# Patient Record
Sex: FEMALE | Race: BLACK OR AFRICAN AMERICAN
[De-identification: names, ages, dates, MRNs, and addresses within clinical notes are randomized per-mention and may not be internally consistent; named-entity substitution may affect disease eponyms.]

---
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
## 2023-06-16 ENCOUNTER — HOSPITAL ENCOUNTER (OUTPATIENT)
Dept: HOSPITAL 47 - FBPOP | Age: 18
Discharge: HOME | End: 2023-06-16
Attending: OBSTETRICS & GYNECOLOGY
Payer: COMMERCIAL

## 2023-06-16 ENCOUNTER — HOSPITAL ENCOUNTER (EMERGENCY)
Dept: HOSPITAL 47 - EC | Age: 18
Discharge: HOME | End: 2023-06-16
Payer: COMMERCIAL

## 2023-06-16 VITALS
SYSTOLIC BLOOD PRESSURE: 100 MMHG | RESPIRATION RATE: 16 BRPM | DIASTOLIC BLOOD PRESSURE: 63 MMHG | HEART RATE: 83 BPM | TEMPERATURE: 97 F

## 2023-06-16 VITALS — HEART RATE: 85 BPM | SYSTOLIC BLOOD PRESSURE: 91 MMHG | DIASTOLIC BLOOD PRESSURE: 52 MMHG

## 2023-06-16 VITALS — RESPIRATION RATE: 18 BRPM | TEMPERATURE: 98.3 F

## 2023-06-16 VITALS
DIASTOLIC BLOOD PRESSURE: 53 MMHG | TEMPERATURE: 97.8 F | SYSTOLIC BLOOD PRESSURE: 104 MMHG | RESPIRATION RATE: 16 BRPM | HEART RATE: 74 BPM

## 2023-06-16 DIAGNOSIS — Z86.59: ICD-10-CM

## 2023-06-16 DIAGNOSIS — Z3A.20: ICD-10-CM

## 2023-06-16 DIAGNOSIS — O26.892: Primary | ICD-10-CM
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
## 2023-06-16 DIAGNOSIS — Z87.891: ICD-10-CM

## 2023-06-16 DIAGNOSIS — O26.812: Primary | ICD-10-CM

## 2023-06-16 LAB
ALBUMIN SERPL-MCNC: 3.7 G/DL (ref 3.5–5)
ALP SERPL-CCNC: 72 U/L (ref 38–126)
ALT SERPL-CCNC: 18 U/L (ref 4–34)
ANION GAP SERPL CALC-SCNC: 7 MMOL/L
AST SERPL-CCNC: 19 U/L (ref 14–36)
BASOPHILS # BLD AUTO: 0 K/UL (ref 0–0.2)
BASOPHILS NFR BLD AUTO: 0 %
BUN SERPL-SCNC: 11 MG/DL (ref 7–17)
CALCIUM SPEC-MCNC: 8.6 MG/DL (ref 8.4–10.2)
CHLORIDE SERPL-SCNC: 103 MMOL/L (ref 98–107)
CO2 SERPL-SCNC: 22 MMOL/L (ref 22–30)
EOSINOPHIL # BLD AUTO: 0.1 K/UL (ref 0–0.7)
EOSINOPHIL NFR BLD AUTO: 1 %
ERYTHROCYTE [DISTWIDTH] IN BLOOD BY AUTOMATED COUNT: 4.08 M/UL (ref 3.8–5.4)
ERYTHROCYTE [DISTWIDTH] IN BLOOD: 13.6 % (ref 11.5–15.5)
GLUCOSE BLD-MCNC: 137 MG/DL (ref 70–110)
GLUCOSE BLD-MCNC: 90 MG/DL (ref 70–110)
GLUCOSE SERPL-MCNC: 85 MG/DL (ref 74–99)
HCT VFR BLD AUTO: 31.6 % (ref 34–46)
HGB BLD-MCNC: 10.7 GM/DL (ref 11.4–16)
LYMPHOCYTES # SPEC AUTO: 1.2 K/UL (ref 1–4.8)
LYMPHOCYTES NFR SPEC AUTO: 18 %
MCH RBC QN AUTO: 26.3 PG (ref 25–35)
MCHC RBC AUTO-ENTMCNC: 33.9 G/DL (ref 31–37)
MCV RBC AUTO: 77.6 FL (ref 80–100)
MONOCYTES # BLD AUTO: 0.5 K/UL (ref 0–1)
MONOCYTES NFR BLD AUTO: 7 %
NEUTROPHILS # BLD AUTO: 4.9 K/UL (ref 1.3–7.7)
NEUTROPHILS NFR BLD AUTO: 73 %
PLATELET # BLD AUTO: 220 K/UL (ref 150–450)
POTASSIUM SERPL-SCNC: 4.3 MMOL/L (ref 3.5–5.1)
PROT SERPL-MCNC: 6.9 G/DL (ref 6.3–8.2)
SODIUM SERPL-SCNC: 132 MMOL/L (ref 137–145)
WBC # BLD AUTO: 6.8 K/UL (ref 4–11)

## 2023-06-16 PROCEDURE — 99213 OFFICE O/P EST LOW 20 MIN: CPT

## 2023-06-16 PROCEDURE — 96361 HYDRATE IV INFUSION ADD-ON: CPT

## 2023-06-16 PROCEDURE — 93005 ELECTROCARDIOGRAM TRACING: CPT

## 2023-06-16 PROCEDURE — 99284 EMERGENCY DEPT VISIT MOD MDM: CPT

## 2023-06-16 PROCEDURE — 85025 COMPLETE CBC W/AUTO DIFF WBC: CPT

## 2023-06-16 PROCEDURE — 96360 HYDRATION IV INFUSION INIT: CPT

## 2023-06-16 PROCEDURE — 36415 COLL VENOUS BLD VENIPUNCTURE: CPT

## 2023-06-16 PROCEDURE — 80053 COMPREHEN METABOLIC PANEL: CPT

## 2023-06-16 NOTE — ED
General Adult HPI





- General


Chief complaint: Fall


Stated complaint: fall, 20 wks preg


Time Seen by Provider: 23 19:25


Source: patient, RN notes reviewed


Mode of arrival: wheelchair


Limitations: no limitations





- History of Present Illness


Initial comments: 





18-year-old -American female who is approximately 20 weeks pregnant 

presents the emergency department with a chief complaint of syncope.  Patient 

reports that she was at the grocery store when she was pushing a cart and fell 

forward.  She reports hitting her head on the cart.  She reports that she woke 

up with someone holding her feet up.  She is unsure for how long she lost 

consciousness.  She has never had a history of this before.  She is reporting 

feeling dizzy and having a headache at this time.  Denies history of seizures or

diabetes.  Denies chest pain, shortness of breath, vision changes, vision loss, 

nausea, vomiting.  She reports that she did eat something earlier today.





- Related Data


                                Home Medications











 Medication  Instructions  Recorded  Confirmed


 


Prenatal Vit No.179/Iron/Folic 1 tab PO DAILY 22





[Prenatal Tablet]   











                                    Allergies











Allergy/AdvReac Type Severity Reaction Status Date / Time


 


No Known Allergies Allergy   Verified 23 19:17














Review of Systems


ROS Statement: 


Those systems with pertinent positive or pertinent negative responses have been 

documented in the HPI.





ROS Other: All systems not noted in ROS Statement are negative.





Past Medical History


Past Medical History: No Reported History


History of Any Multi-Drug Resistant Organisms: None Reported


Past Surgical History:  Section


Additional Past Surgical History / Comment(s):  


Past Anesthesia/Blood Transfusion Reactions: No Reported Reaction


Past Psychological History: Anxiety, Depression


Smoking Status: Former smoker


Past Alcohol Use History: None Reported


Past Drug Use History: None Reported





- Past Family History


  ** Mother


Additional Family Medical History / Comment(s): Heart Disease, ADHD





General Exam





- General Exam Comments


Initial Comments: 





General: Alert, in no acute distress


Head: atraumatic normocephalic.  Eyes PERRL, EOMI intact, mucous membranes moist




Respiratory: Lungs clear to auscultation bilaterally


Cardiovascular: Heart rate regular rate and rhythm


Abdominal: Soft without guarding or rebound


Extremities: Normal inspection with full range of motion and normal capillary 

refill


Neuroogic: alert and oriented 3, CN II-XII intact, able to ambulate with steady

gait 


Skin: warm dry and intact with normal color


Limitations: no limitations





Course


                                   Vital Signs











  23





  19:13 23:40


 


Temperature 98.3 F 


 


Pulse Rate 78 85


 


Respiratory 18 18





Rate  


 


Blood Pressure 101/64 91/52


 


O2 Sat by Pulse 100 99





Oximetry  














- Reevaluation(s)


Reevaluation #1: 





23 23:10


Patient reevaluated.  Patient still unable to provide urine sample at this time.

 Patient is requesting to be discharged home at this time.





EKG Findings





- EKG Comments:


EKG Findings:: I interpreted the following: EKG performed at 19:52 rate 82 bpm 

normal sinus rhythm.  NM interval 138, QRS duration 89, QT/QTc 367/406





Medical Decision Making





- Medical Decision Making





Was pt. sent in by a medical professional or institution (BRENDA Metzger, NP, urgent 

care, hospital, or nursing home...) When possible be specific


@  -[No]


Did you speak to anyone other than the patient for history (EMS, parent, family,

 police, friend...)? What history was obtained from this source 


@  -[No]


Did you review nursing and triage notes (agree or disagree)?  Why? 


@  -[I reviewed and agree with nursing and triage notes]


Were old charts reviewed (outside hosp., previous admission, EMS record, old 

EKG, old radiological studies, urgent care reports/EKG's, nursing home records)?

Report findings 


@  -[No old charts were reviewed]


Differential Diagnosis (chest pain, altered mental status, abdominal pain women,

abdominal pain men, vaginal bleeding, weakness, fever, dyspnea, syncope, 

headache, dizziness, GI bleed, back pain, seizure, CVA, palpatations, mental 

health, musculoskeletal)? 


@  -[not applicable]


EKG interpreted by me (3pts min.).


@  -[As above]


X-rays interpreted by me (1pt min.).


@  -[None done]


CT interpreted by me (1pt min.).


@  -[None done]


U/S interpreted by me (1pt. min.).


@  -[None done]


What testing was considered but not performed or refused? (CT, X-rays, U/S, 

labs)? Why?


@  -[None]


What meds were considered but not given or refused? Why?


@  -[None]


Did you discuss the management of the patient with other professionals 

(professionals i.e. , PA, NP, lab, RT, psych nurse, , , 

teacher, , )? Give summary


@  -[No]


Was smoking cessation discussed for >3mins.?


@  -[No]


Was critical care preformed (if so, how long)?


@  -[No]


Were there social determinants of health that impacted care today? How? 

(Homelessness, low income, unemployed, alcoholism, drug addiction, 

transportation, low edu. Level, literacy, decrease access to med. care, halfway, 

rehab)?


@  -[No]


Was there de-escalation of care discussed even if they declined (Discuss DNR or 

withdrawal of care, Hospice)? DNR status


@  -[No]


What co-morbidities impacted this encounter? (DM, HTN, Smoking, COPD, CAD, 

Cancer, CVA, ARF, Chemo, Hep., AIDS, mental health diagnosis, sleep apnea, 

morbid obesity)?


@  -[None]


Was patient admitted / discharged? Hospital course, mention meds given and 

route, prescriptions, significant lab abnormalities, going to OR and other 

pertinent info.


@  @Discharged.  18-year-old  female who presents the emergency 

department with syncope.  Patient had a thorough history and physical exam perf

ormed on the ED.  Physical exam is essentially unremarkable heart rate regular 

rate and rhythm, lungs clear to auscultation bilaterally, abdomen soft and non-

tender.   Patient had lab work and imaging performed which was negative.  

Patient was encouraged to provide a urine sample however she was unable to 

during the course of the ED.  She verbalized that she would like to be 

discharged home.  I discussed the results in detail with the patient verbalized 

understanding and all questions were addressed.  Return precautions were 

discussed at length.  Patient discharged in stable condition.  Case discussed 

with CSÉAR Gomez who agrees with plan of care.


Undiagnosed new problem with uncertain prognosis?


@  -[No]


Drug Therapy requiring intensive monitoring for toxicity (Heparin, Nitro, 

Insulin, Cardizem)?


@  -[No]


Were any procedures done?


@  -[No]


Diagnosis/symptom?


@  -Syncope 


Acute, or Chronic, or Acute on Chronic?


@  -Acute 


Uncomplicated (without systemic symptoms) or Complicated (systemic symptoms)?


@  -Uncomplicated 


Side effects of treatment?


@  -[No]


Exacerbation, Progression, or Severe Exacerbation?


@  -[No]


Poses a threat to life or bodily function? How? (Chest pain, USA, MI, pneumonia,

 PE, COPD, DKA, ARF, appy, cholecystitis, CVA, Diverticulitis, Homicidal, 

Suicidal, threat to staff... and all critical care pts)


@  -Low likelihood 





- Lab Data


Result diagrams: 


                                 23 20:00





                                 23 20:00


                                   Lab Results











  23 Range/Units





  20:00 20:00 20:46 


 


WBC  6.8    (4.0-11.0)  k/uL


 


RBC  4.08    (3.80-5.40)  m/uL


 


Hgb  10.7 L    (11.4-16.0)  gm/dL


 


Hct  31.6 L    (34.0-46.0)  %


 


MCV  77.6 L    (80.0-100.0)  fL


 


MCH  26.3    (25.0-35.0)  pg


 


MCHC  33.9    (31.0-37.0)  g/dL


 


RDW  13.6    (11.5-15.5)  %


 


Plt Count  220    (150-450)  k/uL


 


MPV  8.4    


 


Neutrophils %  73    %


 


Lymphocytes %  18    %


 


Monocytes %  7    %


 


Eosinophils %  1    %


 


Basophils %  0    %


 


Neutrophils #  4.9    (1.3-7.7)  k/uL


 


Lymphocytes #  1.2    (1.0-4.8)  k/uL


 


Monocytes #  0.5    (0-1.0)  k/uL


 


Eosinophils #  0.1    (0-0.7)  k/uL


 


Basophils #  0.0    (0-0.2)  k/uL


 


Sodium   132 L   (137-145)  mmol/L


 


Potassium   4.3   (3.5-5.1)  mmol/L


 


Chloride   103   ()  mmol/L


 


Carbon Dioxide   22   (22-30)  mmol/L


 


Anion Gap   7   mmol/L


 


BUN   11   (7-17)  mg/dL


 


Creatinine   0.46 L   (0.52-1.04)  mg/dL


 


Est GFR (CKD-EPI)AfAm   >90   (>60 ml/min/1.73 sqM)  


 


Est GFR (CKD-EPI)NonAf   >90   (>60 ml/min/1.73 sqM)  


 


Glucose   85   (74-99)  mg/dL


 


POC Glucose (mg/dL)    90  ()  mg/dL


 


POC Glu Operater ID      


 


Calcium   8.6   (8.4-10.2)  mg/dL


 


Total Bilirubin   0.3   (0.2-1.3)  mg/dL


 


AST   19   (14-36)  U/L


 


ALT   18   (4-34)  U/L


 


Alkaline Phosphatase   72   ()  U/L


 


Total Protein   6.9   (6.3-8.2)  g/dL


 


Albumin   3.7   (3.5-5.0)  g/dL














Disposition


Clinical Impression: 


 Syncope





Disposition: HOME SELF-CARE


Condition: Stable


Instructions (If sedation given, give patient instructions):  Syncope (DC)


Additional Instructions: 


Please follow-up with OB/GYN sometime next week





Please monitor symptoms closely and increase water





Please return to the nearest emergency department symptoms worsen or persist


Is patient prescribed a controlled substance at d/c from ED?: No


Referrals: 


None,Stated [Primary Care Provider] - 1-2 days


Time of Disposition: 23:10

## 2023-06-17 NOTE — P.MSEPDOC
Presenting Problems





- Arrival Data


Date of Arrival on Unit: 23


Time of Arrival on Unit: 18:15


Mode of Transport: Wheelchair





- Complaint


OB-Reason for Admission/Chief Complaint: Headache, Observation/Evaluation, Other


Comment: assess fht's. bp, blood sugar and assess pt.





Prenatal Medical History





- Pregnancy Information


: 3


Para: 2


Term: 1


: 1


Abortions: Spontaneous or Elective: 0


Number of Living Children: 2





- Gestational Age


Gestational Age by ARTURO (wks/days): 20 Weeks and 5 Days





- Prenatal History


Pregnancy Complications: Prior , Other


Comment: pt fell after blanking out while grocery shopping





Review of Systems





- Review of Systems


Constitutional: No problems


Breast: No problems


ENT: No problems


Cardiovascular: No problems


Respiratory: No problems


Gastrointestinal: No problems


Genitourinary: No problems


Musculoskeletal: No problems


Neurological: No problems


Skin: No problems


Comment: bruise on rt forearm, states has a bad headache, dizzy and tired





Vital Signs





- Temperature


Temperature: 97.0 F


Temperature Source: Temporal Artery Scan





- Pulse


  ** Right Radial


Pulse Rate: 83


Pulse Assessment Method: Automatic Cuff





- Respirations


Respiratory Rate: 16


Oxygen Delivery Method: Room Air


O2 Sat by Pulse Oximetry: 99





- Blood Pressure


  ** Right Arm


Blood Pressure: 100/63


Blood Pressure Mean: 75


Blood Pressure Source: Automatic Cuff





Medical Screen Scoring





- Uterine Contractions


Intensity: Absent





- Fetal Assessment - Baby A


Baseline FHR: 140


Fetal Heart Rate - NICHD Category: Category I (Normal)


NST: Reactive





Physician Notification





- Physician Notified


Physician Notified Date: 23


Physician Notified Time: 18:50


Physician: Mahnaz Philip


New Order Received: Yes (to be seen in er. obst. cleared. call for appt this 

week in office)





Maternal Fetal Triage Index





- Urgent/Priority 2


Urgent Priority 2: Yes


Provider Notified: Mahnaz Philip


Provider Notified Time: 18:55


Criteria Met for Priority 2: pt fell in grocery store after blaking out. then 

backwards and hit head again. dizzyness and headache. assessed baby . fht's 

140's . vs stable, bs 137. to e.r. to be evaluated





Disposition





- Disposition


OB Disposition: Transfer to other dept./facility, Written follow up instructions

reviewed


Transferred to:: er


Discharge Date: 06/16/23


Discharge Time: 18:55


I agree with the RN Medical Screening Exam: Yes


Case reviewed; plan agreed upon as documented in EMR&OBIX.: Yes


Diagnosis: HEADACHE * DO NOT USE *

## 2023-06-19 NOTE — P.MSEPDOC
Presenting Problems





- Arrival Data


Date of Arrival on Unit: 23


Time of Arrival on Unit: 08:10


Mode of Transport: Ambulatory





- Complaint


OB-Reason for Admission/Chief Complaint: Rule Out SROM, Decreased Fetal Movement





Prenatal Medical History





- Pregnancy Information


: 3


Para: 2


: 1


Abortions: Spontaneous or Elective: 0


Number of Living Children: 2





- Gestational Age


Gestational Age by ARUTRO (wks/days): 20 Weeks and 5 Days





Review of Systems





- Review of Systems


Constitutional: No problems


Breast: No problems


ENT: No problems


Cardiovascular: No problems


Respiratory: No problems


Gastrointestinal: No problems


Genitourinary: No problems


Musculoskeletal: No problems


Neurological: No problems


Skin: No problems





Vital Signs





- Temperature


Temperature: 97.8 F


Temperature Source: Temporal Artery Scan





- Pulse


  ** Right Apical


Pulse Rate: 74


Pulse Assessment Method: Auscultation





- Respirations


Respiratory Rate: 16


Oxygen Delivery Method: Room Air


O2 Sat by Pulse Oximetry: 99





- Blood Pressure


  ** Right Arm


Blood Pressure: 104/53


Blood Pressure Mean: 70


Blood Pressure Source: Automatic Cuff





Medical Screen Scoring





- Uterine Contractions


Intensity: Absent





- Fetal Assessment - Baby A


Baseline FHR: 135


Fetal Heart Rate - NICHD Category: Category I (Normal)


NST: Reactive





Physician Notification





- Physician Notified


Physician Notified Date: 23


Physician Notified Time: 08:40


Physician: JOSUE Hernandez Order Received: Yes





- Notification Comment


Comment: home with instructions





Maternal Fetal Triage Index





- Non-Urgent/Priority 4


Non-Urgent Priority 4: Yes


Criteria Met for Priority 4: amnisure neg.





Disposition





- Disposition


OB Disposition: Physician follow up in office, Discharge to home, Written follow

up instructions reviewed


Discharge Date: 23


Discharge Time: 08:45


I agree with the RN Medical Screening Exam: Yes


Physician's MSE Comment: 


I have neither seen nor examined the patient





Case reviewed; plan agreed upon as documented in EMR&OBIX.: Yes


Diagnosis: PREGNANCY RELATED CONDITIONS, UNSPECIFIED, SECOND TRIMESTER

## 2023-07-31 ENCOUNTER — HOSPITAL ENCOUNTER (OUTPATIENT)
Dept: HOSPITAL 47 - FBPOP | Age: 18
Discharge: HOME | End: 2023-07-31
Attending: OBSTETRICS & GYNECOLOGY
Payer: COMMERCIAL

## 2023-07-31 VITALS
TEMPERATURE: 97.8 F | SYSTOLIC BLOOD PRESSURE: 111 MMHG | HEART RATE: 108 BPM | RESPIRATION RATE: 16 BRPM | DIASTOLIC BLOOD PRESSURE: 66 MMHG

## 2023-07-31 DIAGNOSIS — O36.8930: Primary | ICD-10-CM

## 2023-07-31 DIAGNOSIS — Z3A.27: ICD-10-CM

## 2023-07-31 LAB
PH UR: 7 [PH] (ref 5–8)
RBC UR QL: 1 /HPF (ref 0–5)
SP GR UR: 1.02 (ref 1–1.03)
SQUAMOUS UR QL AUTO: 1 /HPF (ref 0–4)
UROBILINOGEN UR QL STRIP: <2 MG/DL (ref ?–2)

## 2023-07-31 PROCEDURE — 81001 URINALYSIS AUTO W/SCOPE: CPT

## 2023-07-31 PROCEDURE — 99213 OFFICE O/P EST LOW 20 MIN: CPT

## 2023-07-31 PROCEDURE — 84112 EVAL AMNIOTIC FLUID PROTEIN: CPT

## 2023-08-06 ENCOUNTER — HOSPITAL ENCOUNTER (OUTPATIENT)
Dept: HOSPITAL 47 - FBPOP | Age: 18
Discharge: HOME | End: 2023-08-06
Attending: OBSTETRICS & GYNECOLOGY
Payer: COMMERCIAL

## 2023-08-06 VITALS
HEART RATE: 82 BPM | RESPIRATION RATE: 17 BRPM | DIASTOLIC BLOOD PRESSURE: 46 MMHG | TEMPERATURE: 97.3 F | SYSTOLIC BLOOD PRESSURE: 93 MMHG

## 2023-08-06 DIAGNOSIS — O26.893: Primary | ICD-10-CM

## 2023-08-06 DIAGNOSIS — Z3A.28: ICD-10-CM

## 2023-08-06 LAB
PH UR: 6.5 [PH] (ref 5–8)
SP GR UR: 1.03 (ref 1–1.03)
UROBILINOGEN UR QL STRIP: <2 MG/DL (ref ?–2)

## 2023-08-06 PROCEDURE — 81003 URINALYSIS AUTO W/O SCOPE: CPT

## 2023-08-06 PROCEDURE — 59025 FETAL NON-STRESS TEST: CPT

## 2023-08-06 PROCEDURE — 99213 OFFICE O/P EST LOW 20 MIN: CPT

## 2023-08-06 NOTE — P.MSEPDOC
Presenting Problems





- Arrival Data


Date of Arrival on Unit: 23


Time of Arrival on Unit: 16:11


Mode of Transport: Ambulatory





- Complaint


OB-Reason for Admission/Chief Complaint: Rule Out SROM





Prenatal Medical History





- Pregnancy Information


: 3


Para: 2


Term: 1


: 1


Abortions: Spontaneous or Elective: 0


Number of Living Children: 2





- Gestational Age


Gestational Age by ARTURO (wks/days): 27 Weeks and 1 Days





- Prenatal History


Pregnancy Complications: Prior 





Review of Systems





- Review of Systems


Constitutional: No problems


Breast: No problems


ENT: No problems


Cardiovascular: No problems


Respiratory: No problems


Gastrointestinal: No problems


Genitourinary: No problems


Musculoskeletal: No problems


Neurological: No problems


Skin: No problems





Vital Signs





- Temperature


Temperature: 97.8 F


Temperature Source: Oral





- Pulse


  ** Right


Pulse Rate: 108


Pulse Assessment Method: Automatic Cuff





- Respirations


Respiratory Rate: 16


Oxygen Delivery Method: Room Air


O2 Sat by Pulse Oximetry: 98





- Blood Pressure


  ** Right Arm Sitting


Blood Pressure: 111/66


Blood Pressure Mean: 81


Blood Pressure Source: Automatic Cuff





Medical Screen Scoring





- Cervical Exam


Dilation (cm): 0


Effacement (%): 0


Station: -3


Membranes: Intact





- Uterine Contractions


Frequency From (mins): 0





- Fetal Assessment - Baby A


Baseline FHR: 140


Fetal Heart Rate - NICHD Category: Category II (Indeterminate)





Physician Notification





- Physician Notified


Physician Notified Date: 23


Physician Notified Time: 17:03


Physician: Saida Amaya


New Order Received: Yes





- Notification Comment


Comment: dc home if UA neg





Maternal Fetal Triage Index





- Stat/Priority 1


Stat Priority 1: No





- Urgent/Priority 2


Urgent Priority 2: Yes


Provider Notified: Saida Amaya


Provider Notified Time: 17:03


Criteria Met for Priority 2: 27 week questionable ROM





Disposition





- Disposition


OB Disposition: Triage, Discharge to home, Written follow up instructions 

reviewed


Discharge Date: 23


Discharge Time: 17:35


I agree with the RN Medical Screening Exam: Yes


Physician's MSE Comment: 





I have neither seen nor examined the patient


Case reviewed; plan agreed upon as documented in EMR&OBIX.: Yes


Diagnosis: MATERNAL CARE FOR FETAL PROBLEM, UNSP, THIRD  * DO NOT USE *

## 2023-08-17 NOTE — P.MSEPDOC
Presenting Problems





- Arrival Data


Date of Arrival on Unit: 23


Time of Arrival on Unit: 13:27


Mode of Transport: Ambulatory





- Complaint


OB-Reason for Admission/Chief Complaint: Pain


Comment: pt presents to triage with lower back pain and loss of mucous plug





Prenatal Medical History





- Pregnancy Information


: 3


Para: 2


Term: 2


: 0


Abortions: Spontaneous or Elective: 0


Number of Living Children: 2





- Gestational Age


Gestational Age by ARTURO (wks/days): 28 Weeks and 0 Days





- Prenatal History


Pregnancy Complications: Prior 





Review of Systems





- Review of Systems


Constitutional: No problems


Breast: No problems


ENT: No problems


Cardiovascular: No problems


Respiratory: No problems


Gastrointestinal: No problems


Genitourinary: No problems


Musculoskeletal: No problems


Neurological: No problems


Skin: No problems





Vital Signs





- Temperature


Temperature: 97.3 F


Temperature Source: Temporal Artery Scan





- Pulse


  ** Right Brachial


Pulse Rate: 82


Pulse Assessment Method: Automatic Cuff





- Respirations


Respiratory Rate: 17


Oxygen Delivery Method: Room Air





- Blood Pressure


  ** Right Arm


Blood Pressure: 93/46


Blood Pressure Mean: 61


Blood Pressure Source: Automatic Cuff





Medical Screen Scoring





- Cervical Exam


Dilation (cm): 0


Membranes: Intact





- Fetal Assessment - Baby A


Baseline FHR: 130


Fetal Heart Rate - NICHD Category: Category I (Normal)


NST: Reactive





Physician Notification





- Physician Notified


Physician Notified Date: 23


Physician Notified Time: 13:53


Physician: Saida Amaya


New Order Received: Yes





- Notification Comment


Comment: UA sent, pt given oral hydration, NST reactive, pt discharged home, she

has scheduled appt in office on Wed





Maternal Fetal Triage Index





- Maternal Fetal Triage Index


Presenting for scheduled procedure w/no complaint: No





- Stat/Priority 1


Stat Priority 1: No





- Urgent/Priority 2


Urgent Priority 2: Yes


Provider Notified: Saida Amaya


Provider Notified Time: 13:53


Criteria Met for Priority 2: pt presents to triage with lower back pain and loss

of mucous plug





- Prompt/Priority 3


Prompt Priority 3: Yes


Criteria Met for Priority 3: na





Disposition





- Disposition


OB Disposition: Triage, Discharge to home, Written follow up instructions 

reviewed


Discharge Date: 23


Discharge Time: 14:45


I agree with the RN Medical Screening Exam: Yes


Physician's MSE Comment: 


I have neither seen nor examined the patient





Case reviewed; plan agreed upon as documented in EMR&OBIX.: Yes


Diagnosis: PREGNANCY RELATED CONDITIONS, UNSPECIFIED, THIRD TRIMESTER

## 2024-11-17 ENCOUNTER — HOSPITAL ENCOUNTER (EMERGENCY)
Dept: HOSPITAL 47 - EC | Age: 19
Discharge: HOME | End: 2024-11-17
Payer: COMMERCIAL

## 2024-11-17 VITALS
SYSTOLIC BLOOD PRESSURE: 136 MMHG | RESPIRATION RATE: 20 BRPM | DIASTOLIC BLOOD PRESSURE: 83 MMHG | TEMPERATURE: 98 F | HEART RATE: 73 BPM

## 2024-11-17 DIAGNOSIS — F41.9: Primary | ICD-10-CM

## 2024-11-17 PROCEDURE — 99284 EMERGENCY DEPT VISIT MOD MDM: CPT

## 2024-11-17 PROCEDURE — 82075 ASSAY OF BREATH ETHANOL: CPT
